# Patient Record
Sex: MALE | Race: WHITE | NOT HISPANIC OR LATINO | URBAN - METROPOLITAN AREA
[De-identification: names, ages, dates, MRNs, and addresses within clinical notes are randomized per-mention and may not be internally consistent; named-entity substitution may affect disease eponyms.]

---

## 2025-01-01 ENCOUNTER — OFFICE VISIT (OUTPATIENT)
Dept: URGENT CARE | Age: 50
End: 2025-01-01
Payer: COMMERCIAL

## 2025-01-01 VITALS
DIASTOLIC BLOOD PRESSURE: 81 MMHG | OXYGEN SATURATION: 97 % | TEMPERATURE: 98 F | RESPIRATION RATE: 20 BRPM | SYSTOLIC BLOOD PRESSURE: 127 MMHG | WEIGHT: 178 LBS | HEART RATE: 55 BPM | HEIGHT: 70 IN | BODY MASS INDEX: 25.48 KG/M2

## 2025-01-01 DIAGNOSIS — L01.00 IMPETIGO: ICD-10-CM

## 2025-01-01 DIAGNOSIS — J01.00 ACUTE NON-RECURRENT MAXILLARY SINUSITIS: ICD-10-CM

## 2025-01-01 DIAGNOSIS — J02.9 ACUTE PHARYNGITIS, UNSPECIFIED ETIOLOGY: Primary | ICD-10-CM

## 2025-01-01 LAB — POC RAPID STREP: NEGATIVE

## 2025-01-01 PROCEDURE — 1036F TOBACCO NON-USER: CPT | Performed by: NURSE PRACTITIONER

## 2025-01-01 PROCEDURE — 3008F BODY MASS INDEX DOCD: CPT | Performed by: NURSE PRACTITIONER

## 2025-01-01 PROCEDURE — 87880 STREP A ASSAY W/OPTIC: CPT | Performed by: NURSE PRACTITIONER

## 2025-01-01 PROCEDURE — 99203 OFFICE O/P NEW LOW 30 MIN: CPT | Performed by: NURSE PRACTITIONER

## 2025-01-01 RX ORDER — CEPHALEXIN 500 MG/1
500 CAPSULE ORAL 2 TIMES DAILY
Qty: 14 CAPSULE | Refills: 0 | Status: SHIPPED | OUTPATIENT
Start: 2025-01-01 | End: 2025-01-08

## 2025-01-01 RX ORDER — TADALAFIL 20 MG/1
20 TABLET ORAL
COMMUNITY
Start: 2023-08-21

## 2025-01-01 RX ORDER — OMEPRAZOLE 40 MG/1
40 CAPSULE, DELAYED RELEASE ORAL
COMMUNITY
Start: 2024-11-26

## 2025-01-01 RX ORDER — CELECOXIB 200 MG/1
CAPSULE ORAL
COMMUNITY
Start: 2024-10-08

## 2025-01-01 RX ORDER — MUPIROCIN 20 MG/G
OINTMENT TOPICAL 3 TIMES DAILY
Qty: 22 G | Refills: 0 | Status: SHIPPED | OUTPATIENT
Start: 2025-01-01 | End: 2025-01-11

## 2025-01-01 RX ORDER — SERTRALINE HYDROCHLORIDE 100 MG/1
100 TABLET, FILM COATED ORAL
COMMUNITY
Start: 2024-08-08

## 2025-01-01 RX ORDER — UBIDECARENONE/VIT E ACET 100MG-5
100 CAPSULE ORAL
COMMUNITY
Start: 2024-08-08

## 2025-01-01 ASSESSMENT — ENCOUNTER SYMPTOMS
SINUS COMPLAINT: 1
SORE THROAT: 1

## 2025-01-01 NOTE — PROGRESS NOTES
"Subjective   Patient ID: Berhane Conway is a 49 y.o. male. They present today with a chief complaint of Sinus Problem and Sore Throat (Nasal congestion, sore throat, left swollen gland x 5 days).    History of Present Illness  Pt presents to the  with the c/o sinus pressure and congestion; pt states left side of neck is painful. He also c/o sore throat, has had tonsillectomy. Denies difficulty swallowing. No fever, no sob, cp or pain with deep inspiration. No other associated symptom or concerns to address at this time.       Sinus Problem  Associated symptoms: sore throat    Sore Throat         Past Medical History  Allergies as of 01/01/2025    (No Known Allergies)       (Not in a hospital admission)       No past medical history on file.    Past Surgical History:   Procedure Laterality Date    OTHER SURGICAL HISTORY  02/03/2020    No history of surgery            Review of Systems  Review of Systems   HENT:  Positive for sore throat.      10 point ROS completed and all are negative other than what is stated in the current HPI                               Objective    Vitals:    01/01/25 1324   BP: 127/81   BP Location: Left arm   Patient Position: Sitting   Pulse: 55   Resp: 20   Temp: 36.7 °C (98 °F)   SpO2: 97%   Weight: 80.7 kg (178 lb)   Height: 1.778 m (5' 10\")     No LMP for male patient.    Physical Exam  Vitals and nursing note reviewed.   Constitutional:       Appearance: Normal appearance.   HENT:      Head: Normocephalic and atraumatic.      Right Ear: Tympanic membrane, ear canal and external ear normal.      Left Ear: Tympanic membrane, ear canal and external ear normal.      Nose: Congestion and rhinorrhea present.      Comments: Rash around the left nares; yellow crusting     Mouth/Throat:      Mouth: Mucous membranes are moist.   Neck:      Comments: Tenderness to the left lateral neck on palpation; do not appreciate lymph node enlargement  Cardiovascular:      Rate and Rhythm: Normal rate " and regular rhythm.      Heart sounds: Normal heart sounds.   Pulmonary:      Effort: Pulmonary effort is normal.      Breath sounds: Normal breath sounds. No wheezing or rhonchi.   Musculoskeletal:      Cervical back: Tenderness present.   Lymphadenopathy:      Cervical: No cervical adenopathy.   Skin:     General: Skin is warm and dry.      Findings: Rash present.      Comments: Rash to left nares   Neurological:      Mental Status: He is alert and oriented to person, place, and time.   Psychiatric:         Behavior: Behavior normal.         Procedures    Point of Care Test & Imaging Results from this visit  No results found for this visit on 01/01/25.   No results found.    Diagnostic study results (if any) were reviewed by WILBER Gao.    Assessment/Plan   Allergies, medications, history, and pertinent labs/EKGs/Imaging reviewed by WILBER Gao.     Medical Decision Making  Acute Sinusitis:  - Good oral hydration  - Take abx and nasal spray as instructed  - Advised on s/s to seek emergent care for  - No erythema or edema to face  - Tylenol/Motrin as needed for pain or fever  - Eliel's vapor rub to chest; humidifier; warm showers/bath  -f/u with PCP in the next few days for re-evaluation    Impetigo:  -Use topical antibiotic as instructed  - Avoid picking at the nose  - Follow-up with PCP for re-evaluation in the next 3-5 days    Acute Pharyngitis:  - Pt has no tonsils  - No difficulty swallowing  - Rapid Strep negative  - Good oral hydration to prevent dehydration  - Warm salt gargles; Cepacol drops/spray OTC  - Advised on s/s to seek emergent care for  - Tylenol/Motrin as needed for pain or fever    Orders and Diagnoses  There are no diagnoses linked to this encounter.    Medical Admin Record      Patient disposition: Home    Electronically signed by WILBER Gao  1:43 PM

## 2025-08-31 ENCOUNTER — OFFICE VISIT (OUTPATIENT)
Dept: URGENT CARE | Age: 50
End: 2025-08-31
Payer: COMMERCIAL

## 2025-08-31 PROBLEM — K21.9 GASTROESOPHAGEAL REFLUX DISEASE: Status: ACTIVE | Noted: 2023-08-21

## 2025-08-31 PROBLEM — E78.00 HYPERCHOLESTEROLEMIA: Status: ACTIVE | Noted: 2023-08-19

## 2025-08-31 PROBLEM — F41.9 ANXIETY: Status: ACTIVE | Noted: 2023-08-21

## 2025-08-31 ASSESSMENT — ENCOUNTER SYMPTOMS
CHILLS: 0
SORE THROAT: 0
NAUSEA: 0
SINUS PAIN: 0
ABDOMINAL PAIN: 0
EYE REDNESS: 0
CHEST TIGHTNESS: 0
VOMITING: 0
EYE ITCHING: 0
DIARRHEA: 0
FATIGUE: 0
JOINT SWELLING: 0
EYE DISCHARGE: 0
ARTHRALGIAS: 0
RHINORRHEA: 0
SHORTNESS OF BREATH: 0
FEVER: 0
EYE PAIN: 0
COUGH: 0